# Patient Record
Sex: FEMALE | Race: WHITE | NOT HISPANIC OR LATINO | Employment: FULL TIME | URBAN - METROPOLITAN AREA
[De-identification: names, ages, dates, MRNs, and addresses within clinical notes are randomized per-mention and may not be internally consistent; named-entity substitution may affect disease eponyms.]

---

## 2018-01-26 ENCOUNTER — OFFICE VISIT (OUTPATIENT)
Dept: URGENT CARE | Facility: CLINIC | Age: 43
End: 2018-01-26
Payer: COMMERCIAL

## 2018-01-26 VITALS
SYSTOLIC BLOOD PRESSURE: 106 MMHG | WEIGHT: 122 LBS | HEIGHT: 67 IN | BODY MASS INDEX: 19.15 KG/M2 | HEART RATE: 97 BPM | DIASTOLIC BLOOD PRESSURE: 60 MMHG | TEMPERATURE: 100.3 F | OXYGEN SATURATION: 98 % | RESPIRATION RATE: 16 BRPM

## 2018-01-26 DIAGNOSIS — J06.9 ACUTE URI: Primary | ICD-10-CM

## 2018-01-26 PROCEDURE — G0382 LEV 3 HOSP TYPE B ED VISIT: HCPCS

## 2018-01-26 RX ORDER — AZITHROMYCIN 250 MG/1
TABLET, FILM COATED ORAL
Qty: 6 TABLET | Refills: 0 | Status: SHIPPED | OUTPATIENT
Start: 2018-01-26 | End: 2018-01-30

## 2018-01-26 NOTE — PATIENT INSTRUCTIONS
You appear to have an upper respiratory infection  You are to take the azithromycin as prescribed  Increase water intake  Saline nasal spray as directed  You are to take Robitussin for cough  You are to follow up with your PCP in 2 days  Acute Bronchitis   WHAT YOU NEED TO KNOW:   Acute bronchitis is swelling and irritation in the air passages of your lungs  This irritation may cause you to cough or have other breathing problems  Acute bronchitis often starts because of another illness, such as a cold or the flu  The illness spreads from your nose and throat to your windpipe and airways  Bronchitis is often called a chest cold  Acute bronchitis lasts about 3 to 6 weeks and is usually not a serious illness  Your cough can last for several weeks  DISCHARGE INSTRUCTIONS:   Return to the emergency department if:   · You cough up blood  · Your lips or fingernails turn blue  · You feel like you are not getting enough air when you breathe  Contact your healthcare provider if:   · You have a fever  · Your breathing problems do not go away or get worse  · Your cough does not get better within 4 weeks  · You have questions or concerns about your condition or care  Self-care:   · Get more rest   Rest helps your body to heal  Slowly start to do more each day  Rest when you feel it is needed  · Avoid irritants in the air  Avoid chemicals, fumes, and dust  Wear a face mask if you must work around dust or fumes  Stay inside on days when air pollution levels are high  If you have allergies, stay inside when pollen counts are high  Do not use aerosol products, such as spray-on deodorant, bug spray, and hair spray  · Do not smoke or be around others who smoke  Nicotine and other chemicals in cigarettes and cigars damages the cilia that move mucus out of your lungs  Ask your healthcare provider for information if you currently smoke and need help to quit   E-cigarettes or smokeless tobacco still contain nicotine  Talk to your healthcare provider before you use these products  · Drink liquids as directed  Liquids help keep your air passages moist and help you cough up mucus  You may need to drink more liquids when you have acute bronchitis  Ask how much liquid to drink each day and which liquids are best for you  · Use a humidifier or vaporizer  Use a cool mist humidifier or a vaporizer to increase air moisture in your home  This may make it easier for you to breathe and help decrease your cough  Decrease risk for acute bronchitis:   · Get the vaccinations you need  Ask your healthcare provider if you should get vaccinated against the flu or pneumonia  · Prevent the spread of germs  You can decrease your risk of acute bronchitis and other illnesses by doing the following:     [facility] [place] often with soap and water  Carry germ-killing hand lotion or gel with you  You can use the lotion or gel to clean your hands when soap and water are not available  ¨ Do not touch your eyes, nose, or mouth unless you have washed your hands first     ¨ Always cover your mouth when you cough to prevent the spread of germs  It is best to cough into a tissue or your shirt sleeve instead of into your hand  Ask those around you cover their mouths when they cough  ¨ Try to avoid people who have a cold or the flu  If you are sick, stay away from others as much as possible  Medicines: Your healthcare provider may  give you any of the following:  · Ibuprofen or acetaminophen  are medicines that help lower your fever  They are available without a doctor's order  Ask your healthcare provider which medicine is right for you  Ask how much to take and how often to take it  Follow directions  These medicines can cause stomach bleeding if not taken correctly  Ibuprofen can cause kidney damage  Do not take ibuprofen if you have kidney disease, an ulcer, or allergies to aspirin  Acetaminophen can cause liver damage  Do not take more than 4,000 milligrams in 24 hours  · Decongestants  help loosen mucus in your lungs and make it easier to cough up  This can help you breathe easier  · Cough suppressants  decrease your urge to cough  If your cough produces mucus, do not take a cough suppressant unless your healthcare provider tells you to  Your healthcare provider may suggest that you take a cough suppressant at night so you can rest     · Inhalers  may be given  Your healthcare provider may give you one or more inhalers to help you breathe easier and cough less  An inhaler gives your medicine to open your airways  Ask your healthcare provider to show you how to use your inhaler correctly  · Take your medicine as directed  Contact your healthcare provider if you think your medicine is not helping or if you have side effects  Tell him of her if you are allergic to any medicine  Keep a list of the medicines, vitamins, and herbs you take  Include the amounts, and when and why you take them  Bring the list or the pill bottles to follow-up visits  Carry your medicine list with you in case of an emergency  Follow up with your healthcare provider as directed:  Write down questions you have so you will remember to ask them during your follow-up visits  © 2017 [employer] Information is for End User's use only and may not be sold, redistributed or otherwise used for commercial purposes  All illustrations and images included in CareNotes® are the copyrighted property of [employer]  or [employer]  The above information is an  only  It is not intended as medical advice for individual conditions or treatments  Talk to your doctor, nurse or pharmacist before following any medical regimen to see if it is safe and effective for you

## 2018-01-26 NOTE — PROGRESS NOTES
Assessment/Plan:      Diagnoses and all orders for this visit:    Acute URI  -     azithromycin (ZITHROMAX) 250 mg tablet; Take 2 tablets today then 1 tablet daily x 4 days          Subjective:     Patient ID: Ciera Hahn is a 43 y o  female  This is a 43year old female who states her son had the subjective flu symptoms and his went away in 3 days  Pt states that she started with flu like symptoms on Monday and continues to have  She sates temp on Monday 101  She has been taking OTC products w/o relief  She states + chills, body aches, headache, + green sputum  She is + smoker  Cough   Associated symptoms include chills, a fever, myalgias and a sore throat  Generalized Body Aches   Associated symptoms include congestion, a sore throat, a fever and coughing  Review of Systems   Constitutional: Positive for chills and fever  HENT: Positive for congestion, sinus pain, sinus pressure and sore throat  Eyes: Negative  Respiratory: Positive for cough  Cardiovascular: Negative  Gastrointestinal: Negative  Endocrine: Negative  Genitourinary: Negative  Musculoskeletal: Positive for myalgias  Skin: Negative  Allergic/Immunologic: Negative  Neurological: Negative  Hematological: Negative  Psychiatric/Behavioral: Negative  Objective:     Physical Exam   Constitutional: She appears well-developed and well-nourished  No distress  HENT:   Head: Normocephalic and atraumatic  Nose: Nose normal    Mouth/Throat: No oropharyngeal exudate  B/L TM bulging   Oropharyngeal injected    Eyes: Conjunctivae are normal  Pupils are equal, round, and reactive to light  Neck: Normal range of motion  Neck supple  Cardiovascular: Normal rate and normal heart sounds  Pulmonary/Chest: Effort normal and breath sounds normal  No respiratory distress  She has no wheezes  She has no rales  She exhibits no tenderness  Abdominal: Soft   Bowel sounds are normal  She exhibits no distension  There is no tenderness  Musculoskeletal: Normal range of motion  She exhibits no edema, tenderness or deformity  Neurological: She is alert  Skin: Skin is warm and dry  She is not diaphoretic  Psychiatric: She has a normal mood and affect  Her behavior is normal  Judgment and thought content normal    Nursing note and vitals reviewed

## 2018-09-07 ENCOUNTER — OFFICE VISIT (OUTPATIENT)
Dept: OBGYN CLINIC | Facility: CLINIC | Age: 43
End: 2018-09-07
Payer: COMMERCIAL

## 2018-09-07 ENCOUNTER — APPOINTMENT (OUTPATIENT)
Dept: RADIOLOGY | Facility: CLINIC | Age: 43
End: 2018-09-07
Payer: COMMERCIAL

## 2018-09-07 VITALS
DIASTOLIC BLOOD PRESSURE: 82 MMHG | SYSTOLIC BLOOD PRESSURE: 108 MMHG | BODY MASS INDEX: 19.34 KG/M2 | HEIGHT: 67 IN | WEIGHT: 123.2 LBS

## 2018-09-07 DIAGNOSIS — M79.644 PAIN OF FINGER OF RIGHT HAND: ICD-10-CM

## 2018-09-07 DIAGNOSIS — S61.214A LACERATION OF RIGHT RING FINGER WITHOUT FOREIGN BODY WITHOUT DAMAGE TO NAIL, INITIAL ENCOUNTER: Primary | ICD-10-CM

## 2018-09-07 PROCEDURE — 73140 X-RAY EXAM OF FINGER(S): CPT

## 2018-09-07 PROCEDURE — 99203 OFFICE O/P NEW LOW 30 MIN: CPT | Performed by: ORTHOPAEDIC SURGERY

## 2018-09-07 NOTE — ASSESSMENT & PLAN NOTE
Findings consistent with right ring finger laceration, suspect healing extensor tendon laceration  Discussed findings and treatment options with the patient  I have review patient's right ring finger x-ray with her  Discussed prognosis of her injury  I recommend patient to attend occupational therapy to rehabilitate her finger  I think patient's symptoms may be related to scar tissue over the laceration  She appeared to have good finger function  I would like to see patient back in 3-4 weeks for re-evaluation  All patient's questions were answered to her satisfaction  This note is created using dictation transcription  It may contain typographical errors, grammatical errors, improperly dictated words, background noise and other errors

## 2018-09-07 NOTE — PROGRESS NOTES
Assessment:     1  Laceration of right ring finger without foreign body without damage to nail, initial encounter    2  Pain of finger of right hand        Plan:     Problem List Items Addressed This Visit        Other    Laceration of right ring finger - Primary     Findings consistent with right ring finger laceration, suspect healing extensor tendon laceration  Discussed findings and treatment options with the patient  I have review patient's right ring finger x-ray with her  Discussed prognosis of her injury  I recommend patient to attend occupational therapy to rehabilitate her finger  I think patient's symptoms may be related to scar tissue over the laceration  She appeared to have good finger function  I would like to see patient back in 3-4 weeks for re-evaluation  All patient's questions were answered to her satisfaction  This note is created using dictation transcription  It may contain typographical errors, grammatical errors, improperly dictated words, background noise and other errors  Relevant Orders    Ambulatory referral to Occupational Therapy      Other Visit Diagnoses     Pain of finger of right hand        Relevant Orders    XR finger right fourth digit-ring         Subjective:     Patient ID: Evangelina Frias is a 43 y o  female  Chief Complaint:  66-year-old female injured her right ring finger on 7/6/2018  Patient had a laceration to her right ring finger dorsally distal to the PIP joint by a broken glass  Patient stated the laceration is about a cm and horizontally  Patient did not seek medical attention  Her laceration did heal but she noticed persistent swelling and discomfort by the laceration  She also feels that the finger is weaker in extension  She denies fever, chills, or sweats  Information on patient's intake form was reviewed  Allergy:  No Known Allergies  Medications:  all current active meds have been reviewed  Past Medical History:  History reviewed  No pertinent past medical history  Past Surgical History:  Past Surgical History:   Procedure Laterality Date    COLONOSCOPY      TYMPANOSTOMY TUBE PLACEMENT       Family History:  History reviewed  No pertinent family history  Social History:  History   Alcohol Use    Yes     History   Drug use: Unknown     History   Smoking Status    Current Every Day Smoker   Smokeless Tobacco    Never Used     Review of Systems   HENT: Negative  Eyes: Negative  Respiratory: Negative  Cardiovascular: Negative  Gastrointestinal: Negative  Endocrine: Negative  Genitourinary: Negative  Musculoskeletal: Positive for arthralgias (Right ring finger) and joint swelling (Right ring finger)  Skin: Negative  Allergic/Immunologic: Negative  Neurological: Positive for weakness (Right ring finger in extension)  Negative for numbness  Hematological: Negative  Psychiatric/Behavioral: Negative  Objective:  BP Readings from Last 1 Encounters:   09/07/18 108/82      Wt Readings from Last 1 Encounters:   09/07/18 55 9 kg (123 lb 3 2 oz)      BMI:   Estimated body mass index is 19 3 kg/m² as calculated from the following:    Height as of this encounter: 5' 7" (1 702 m)  Weight as of this encounter: 55 9 kg (123 lb 3 2 oz)  BSA:   Estimated body surface area is 1 65 meters squared as calculated from the following:    Height as of this encounter: 5' 7" (1 702 m)  Weight as of this encounter: 55 9 kg (123 lb 3 2 oz)  Physical Exam   Constitutional: She is oriented to person, place, and time  She appears well-developed  HENT:   Head: Normocephalic and atraumatic  Eyes: EOM are normal  Pupils are equal, round, and reactive to light  Neck: Neck supple  Neurological: She is alert and oriented to person, place, and time  Skin: Skin is warm  Psychiatric: She has a normal mood and affect  Nursing note and vitals reviewed      Right Hand Exam     Tenderness   The patient is experiencing tenderness in the dorsal area (Right ring finger distal to the PIP joint)  Range of Motion   The patient has normal right wrist ROM  Muscle Strength   : 5/5     Other   Erythema: absent  Scars: present (Well-healed laceration  No signs of infection)  Sensation: normal  Pulse: present    Comments: There is swelling over the healed laceration  Patient has good active and passive ring finger range of motion  She has good strength with resistant MCP, PIP, and DIP joint extension  I have personally reviewed pertinent films in PACS and my interpretation is Right ring finger show good joint alignment  No metallic foreign body  Soft tissue swelling over the dorsum of middle phalanx at the PIP joint

## 2019-12-02 ENCOUNTER — OFFICE VISIT (OUTPATIENT)
Dept: URGENT CARE | Facility: CLINIC | Age: 44
End: 2019-12-02
Payer: COMMERCIAL

## 2019-12-02 VITALS
WEIGHT: 123 LBS | HEIGHT: 67 IN | HEART RATE: 79 BPM | SYSTOLIC BLOOD PRESSURE: 155 MMHG | OXYGEN SATURATION: 99 % | DIASTOLIC BLOOD PRESSURE: 74 MMHG | BODY MASS INDEX: 19.3 KG/M2 | RESPIRATION RATE: 20 BRPM | TEMPERATURE: 98.8 F

## 2019-12-02 DIAGNOSIS — N30.01 ACUTE CYSTITIS WITH HEMATURIA: ICD-10-CM

## 2019-12-02 DIAGNOSIS — R30.0 DYSURIA: Primary | ICD-10-CM

## 2019-12-02 LAB
SL AMB  POCT GLUCOSE, UA: ABNORMAL
SL AMB LEUKOCYTE ESTERASE,UA: ABNORMAL
SL AMB POCT BILIRUBIN,UA: ABNORMAL
SL AMB POCT BLOOD,UA: ABNORMAL
SL AMB POCT CLARITY,UA: ABNORMAL
SL AMB POCT COLOR,UA: YELLOW
SL AMB POCT KETONES,UA: ABNORMAL
SL AMB POCT NITRITE,UA: ABNORMAL
SL AMB POCT PH,UA: 5
SL AMB POCT SPECIFIC GRAVITY,UA: 1.01
SL AMB POCT URINE PROTEIN: 30
SL AMB POCT UROBILINOGEN: 0.2

## 2019-12-02 PROCEDURE — 81002 URINALYSIS NONAUTO W/O SCOPE: CPT | Performed by: FAMILY MEDICINE

## 2019-12-02 PROCEDURE — G0382 LEV 3 HOSP TYPE B ED VISIT: HCPCS | Performed by: FAMILY MEDICINE

## 2019-12-02 RX ORDER — SULFAMETHOXAZOLE AND TRIMETHOPRIM 800; 160 MG/1; MG/1
1 TABLET ORAL EVERY 12 HOURS SCHEDULED
Qty: 6 TABLET | Refills: 0 | Status: SHIPPED | OUTPATIENT
Start: 2019-12-02 | End: 2019-12-05

## 2022-05-23 ENCOUNTER — APPOINTMENT (EMERGENCY)
Dept: RADIOLOGY | Facility: HOSPITAL | Age: 47
End: 2022-05-23
Payer: COMMERCIAL

## 2022-05-23 ENCOUNTER — HOSPITAL ENCOUNTER (EMERGENCY)
Facility: HOSPITAL | Age: 47
Discharge: HOME | End: 2022-05-23
Attending: EMERGENCY MEDICINE
Payer: COMMERCIAL

## 2022-05-23 VITALS
BODY MASS INDEX: 19.46 KG/M2 | RESPIRATION RATE: 18 BRPM | HEIGHT: 67 IN | DIASTOLIC BLOOD PRESSURE: 74 MMHG | OXYGEN SATURATION: 97 % | WEIGHT: 124 LBS | SYSTOLIC BLOOD PRESSURE: 167 MMHG | TEMPERATURE: 98 F | HEART RATE: 83 BPM

## 2022-05-23 DIAGNOSIS — R07.89 CHEST TIGHTNESS: Primary | ICD-10-CM

## 2022-05-23 LAB
ALBUMIN SERPL BCP-MCNC: 4.2 G/DL (ref 3.5–5)
ALP SERPL-CCNC: 68 U/L (ref 46–116)
ALT SERPL W P-5'-P-CCNC: 19 U/L (ref 12–78)
ANION GAP SERPL CALCULATED.3IONS-SCNC: 7 MMOL/L (ref 4–13)
AST SERPL W P-5'-P-CCNC: 19 U/L (ref 5–45)
BASOPHILS # BLD AUTO: 0.04 THOUSANDS/ΜL (ref 0–0.1)
BASOPHILS NFR BLD AUTO: 1 % (ref 0–1)
BILIRUB SERPL-MCNC: 0.6 MG/DL (ref 0.2–1)
BUN SERPL-MCNC: 12 MG/DL (ref 5–25)
CALCIUM SERPL-MCNC: 9.1 MG/DL (ref 8.3–10.1)
CARDIAC TROPONIN I PNL SERPL HS: <2 NG/L
CHLORIDE SERPL-SCNC: 104 MMOL/L (ref 100–108)
CO2 SERPL-SCNC: 28 MMOL/L (ref 21–32)
CREAT SERPL-MCNC: 0.8 MG/DL (ref 0.6–1.3)
D DIMER PPP FEU-MCNC: <0.27 UG/ML FEU
EOSINOPHIL # BLD AUTO: 0.1 THOUSAND/ΜL (ref 0–0.61)
EOSINOPHIL NFR BLD AUTO: 1 % (ref 0–6)
ERYTHROCYTE [DISTWIDTH] IN BLOOD BY AUTOMATED COUNT: 12.3 % (ref 11.6–15.1)
FLUAV RNA RESP QL NAA+PROBE: NEGATIVE
FLUBV RNA RESP QL NAA+PROBE: NEGATIVE
GFR SERPL CREATININE-BSD FRML MDRD: 88 ML/MIN/1.73SQ M
GLUCOSE SERPL-MCNC: 142 MG/DL (ref 65–140)
HCT VFR BLD AUTO: 41.6 % (ref 34.8–46.1)
HGB BLD-MCNC: 13.4 G/DL (ref 11.5–15.4)
IMM GRANULOCYTES # BLD AUTO: 0.01 THOUSAND/UL (ref 0–0.2)
IMM GRANULOCYTES NFR BLD AUTO: 0 % (ref 0–2)
LYMPHOCYTES # BLD AUTO: 1.71 THOUSANDS/ΜL (ref 0.6–4.47)
LYMPHOCYTES NFR BLD AUTO: 23 % (ref 14–44)
MCH RBC QN AUTO: 32.2 PG (ref 26.8–34.3)
MCHC RBC AUTO-ENTMCNC: 32.2 G/DL (ref 31.4–37.4)
MCV RBC AUTO: 100 FL (ref 82–98)
MONOCYTES # BLD AUTO: 0.67 THOUSAND/ΜL (ref 0.17–1.22)
MONOCYTES NFR BLD AUTO: 9 % (ref 4–12)
NEUTROPHILS # BLD AUTO: 4.92 THOUSANDS/ΜL (ref 1.85–7.62)
NEUTS SEG NFR BLD AUTO: 66 % (ref 43–75)
NRBC BLD AUTO-RTO: 0 /100 WBCS
PLATELET # BLD AUTO: 204 THOUSANDS/UL (ref 149–390)
PMV BLD AUTO: 9.1 FL (ref 8.9–12.7)
POTASSIUM SERPL-SCNC: 4.1 MMOL/L (ref 3.5–5.3)
PROT SERPL-MCNC: 7.2 G/DL (ref 6.4–8.2)
RBC # BLD AUTO: 4.16 MILLION/UL (ref 3.81–5.12)
RSV RNA RESP QL NAA+PROBE: NEGATIVE
SARS-COV-2 RNA RESP QL NAA+PROBE: NEGATIVE
SODIUM SERPL-SCNC: 139 MMOL/L (ref 136–145)
WBC # BLD AUTO: 7.45 THOUSAND/UL (ref 4.31–10.16)

## 2022-05-23 PROCEDURE — 85025 COMPLETE CBC W/AUTO DIFF WBC: CPT | Performed by: EMERGENCY MEDICINE

## 2022-05-23 PROCEDURE — 84484 ASSAY OF TROPONIN QUANT: CPT | Performed by: EMERGENCY MEDICINE

## 2022-05-23 PROCEDURE — 80053 COMPREHEN METABOLIC PANEL: CPT | Performed by: EMERGENCY MEDICINE

## 2022-05-23 PROCEDURE — 85379 FIBRIN DEGRADATION QUANT: CPT | Performed by: EMERGENCY MEDICINE

## 2022-05-23 PROCEDURE — 99285 EMERGENCY DEPT VISIT HI MDM: CPT

## 2022-05-23 PROCEDURE — 0241U HB NFCT DS VIR RESP RNA 4 TRGT: CPT | Performed by: EMERGENCY MEDICINE

## 2022-05-23 PROCEDURE — 99285 EMERGENCY DEPT VISIT HI MDM: CPT | Performed by: EMERGENCY MEDICINE

## 2022-05-23 PROCEDURE — 71046 X-RAY EXAM CHEST 2 VIEWS: CPT

## 2022-05-23 PROCEDURE — 36415 COLL VENOUS BLD VENIPUNCTURE: CPT

## 2022-05-23 PROCEDURE — 93005 ELECTROCARDIOGRAM TRACING: CPT

## 2022-05-23 RX ORDER — HYDROXYZINE HYDROCHLORIDE 25 MG/1
25 TABLET, FILM COATED ORAL 3 TIMES DAILY PRN
Qty: 20 TABLET | Refills: 0 | Status: SHIPPED | OUTPATIENT
Start: 2022-05-23

## 2022-05-23 NOTE — ED PROVIDER NOTES
History  Chief Complaint   Patient presents with    Shortness of Breath     Unable to take a deep breath past 2 days       This is a 59-year-old female presents for evaluation of chest tightness that started 2 days ago worse in certain positions denies any fever cough  She did  stop smoking 2 days ago  No birth control use no history of DVT or PE  History provided by:  Patient  Medical Problem  Location:   chest  Quality:   tightness  Severity:  Moderate  Onset quality:  Gradual  Duration:  2 days  Timing:  Constant  Progression:  Waxing and waning  Chronicity:  New  Context:   chest tightness over the past 2 days  Worsened by:   certain positions lying on her right side  Associated symptoms: shortness of breath        None       No past medical history on file  Past Surgical History:   Procedure Laterality Date    COLONOSCOPY      TYMPANOSTOMY TUBE PLACEMENT         No family history on file  I have reviewed and agree with the history as documented  E-Cigarette/Vaping     E-Cigarette/Vaping Substances     Social History     Tobacco Use    Smoking status: Current Every Day Smoker     Packs/day: 1 00    Smokeless tobacco: Never Used   Substance Use Topics    Alcohol use: Yes     Alcohol/week: 2 0 standard drinks     Types: 2 Glasses of wine per week     Comment: daily    Drug use: Never       Review of Systems   Respiratory: Positive for chest tightness and shortness of breath  Psychiatric/Behavioral: The patient is nervous/anxious  All other systems reviewed and are negative  Physical Exam  Physical Exam  Vitals and nursing note reviewed  Constitutional:       General: She is not in acute distress  Appearance: She is not ill-appearing, toxic-appearing or diaphoretic  HENT:      Head: Normocephalic and atraumatic        Right Ear: Tympanic membrane, ear canal and external ear normal       Left Ear: Tympanic membrane, ear canal and external ear normal       Nose: Nose normal  Eyes:      General: No scleral icterus  Right eye: No discharge  Left eye: No discharge  Extraocular Movements: Extraocular movements intact  Pupils: Pupils are equal, round, and reactive to light  Cardiovascular:      Rate and Rhythm: Normal rate and regular rhythm  Pulses: Normal pulses  Heart sounds: No murmur heard  No friction rub  No gallop  Pulmonary:      Effort: Pulmonary effort is normal  No respiratory distress  Breath sounds: No stridor  No wheezing, rhonchi or rales  Abdominal:      General: There is no distension  Palpations: Abdomen is soft  There is no mass  Tenderness: There is no abdominal tenderness  There is no guarding or rebound  Musculoskeletal:         General: No swelling, tenderness, deformity or signs of injury  Normal range of motion  Cervical back: Normal range of motion and neck supple  No rigidity or tenderness  Right lower leg: No edema  Left lower leg: No edema  Skin:     General: Skin is warm and dry  Coloration: Skin is not jaundiced  Findings: No bruising, erythema or rash  Neurological:      General: No focal deficit present  Mental Status: She is alert and oriented to person, place, and time  Cranial Nerves: No cranial nerve deficit  Sensory: No sensory deficit  Motor: No weakness  Coordination: Coordination normal    Psychiatric:         Thought Content:  Thought content normal       Comments:  anxious         Vital Signs  ED Triage Vitals [05/23/22 1255]   Temperature Pulse Respirations Blood Pressure SpO2   98 °F (36 7 °C) 83 18 167/74 97 %      Temp Source Heart Rate Source Patient Position - Orthostatic VS BP Location FiO2 (%)   Oral Monitor Sitting Left arm --      Pain Score       No Pain           Vitals:    05/23/22 1255   BP: 167/74   Pulse: 83   Patient Position - Orthostatic VS: Sitting         Visual Acuity      ED Medications  Medications - No data to display    Diagnostic Studies  Results Reviewed     Procedure Component Value Units Date/Time    D-Dimer [[id number]]  (Normal) Collected: 05/23/22 1639    Lab Status: Final result Specimen: Blood from Arm, Right Updated: 05/23/22 1732     D-Dimer, Quant <0 27 ug/ml FEU     COVID/FLU/RSV [[id number]]  (Normal) Collected: 05/23/22 1302    Lab Status: Final result Specimen: Nares from Nose Updated: 05/23/22 1451     SARS-CoV-2 Negative     INFLUENZA A PCR Negative     INFLUENZA B PCR Negative     RSV PCR Negative    Narrative:      FOR PEDIATRIC PATIENTS - copy/paste COVID Guidelines URL to browser: [email or web address]  [employer]    SARS-CoV-2 assay is a Nucleic Acid Amplification assay intended for the  qualitative detection of nucleic acid from SARS-CoV-2 in nasopharyngeal  swabs  Results are for the presumptive identification of SARS-CoV-2 RNA  Positive results are indicative of infection with SARS-CoV-2, the virus  causing COVID-19, but do not rule out bacterial infection or co-infection  with other viruses  Laboratories within the [place] and its  territories are required to report all positive results to the appropriate  public health authorities  Negative results do not preclude SARS-CoV-2  infection and should not be used as the sole basis for treatment or other  patient management decisions  Negative results must be combined with  clinical observations, patient history, and epidemiological information  This test has not been FDA cleared or approved  This test has been authorized by FDA under an Emergency Use Authorization  (EUA)  This test is only authorized for the duration of time the  declaration that circumstances exist justifying the authorization of the  emergency use of an in vitro diagnostic tests for detection of SARS-CoV-2  virus and/or diagnosis of COVID-19 infection under section 564(b)(1) of  the Act, 21 U  S C  554XMD-2(L)(2), unless the authorization is terminated  or revoked sooner  The test has been validated but independent review by FDA  and CLIA is pending  Test performed using [employer] GeneXpert: This RT-PCR assay targets N2,  a region unique to SARS-CoV-2  A conserved region in the E-gene was chosen  for pan-Sarbecovirus detection which includes SARS-CoV-2      HS Troponin 0hr (reflex protocol) [[id number]]  (Normal) Collected: 05/23/22 1302    Lab Status: Final result Specimen: Blood from Arm, Left Updated: 05/23/22 1417     hs TnI 0hr <2 ng/L     Comprehensive metabolic panel [[id number]]  (Abnormal) Collected: 05/23/22 1302    Lab Status: Final result Specimen: Blood from Arm, Left Updated: 05/23/22 1412     Sodium 139 mmol/L      Potassium 4 1 mmol/L      Chloride 104 mmol/L      CO2 28 mmol/L      ANION GAP 7 mmol/L      BUN 12 mg/dL      Creatinine 0 80 mg/dL      Glucose 142 mg/dL      Calcium 9 1 mg/dL      AST 19 U/L      ALT 19 U/L      Alkaline Phosphatase 68 U/L      Total Protein 7 2 g/dL      Albumin 4 2 g/dL      Total Bilirubin 0 60 mg/dL      eGFR 88 ml/min/1 73sq m     Narrative:      [place] guidelines for Chronic Kidney Disease (CKD):     Stage 1 with normal or high GFR (GFR > 90 mL/min/1 73 square meters)    Stage 2 Mild CKD (GFR = 60-89 mL/min/1 73 square meters)    Stage 3A Moderate CKD (GFR = 45-59 mL/min/1 73 square meters)    Stage 3B Moderate CKD (GFR = 30-44 mL/min/1 73 square meters)    Stage 4 Severe CKD (GFR = 15-29 mL/min/1 73 square meters)    Stage 5 End Stage CKD (GFR <15 mL/min/1 73 square meters)  Note: GFR calculation is accurate only with a steady state creatinine    CBC and differential [[id number]]  (Abnormal) Collected: 05/23/22 1302    Lab Status: Final result Specimen: Blood from Arm, Left Updated: 05/23/22 1308     WBC 7 45 Thousand/uL      RBC 4 16 Million/uL      Hemoglobin 13 4 g/dL      Hematocrit 41 6 %       fL      MCH 32 2 pg      MCHC 32 2 g/dL RDW 12 3 %      MPV 9 1 fL      Platelets 135 Thousands/uL      nRBC 0 /100 WBCs      Neutrophils Relative 66 %      Immat GRANS % 0 %      Lymphocytes Relative 23 %      Monocytes Relative 9 %      Eosinophils Relative 1 %      Basophils Relative 1 %      Neutrophils Absolute 4 92 Thousands/µL      Immature Grans Absolute 0 01 Thousand/uL      Lymphocytes Absolute 1 71 Thousands/µL      Monocytes Absolute 0 67 Thousand/µL      Eosinophils Absolute 0 10 Thousand/µL      Basophils Absolute 0 04 Thousands/µL                  XR chest pa & lateral   Final Result by Luis Carlos Browne MD (05/23 3879)      No acute cardiopulmonary disease                    Workstation performed: [id number]                    Procedures  ECG 12 Lead Documentation Only    Date/Time: 5/23/2022 4:40 PM  Performed by: Alysia Friend DO  Authorized by: Alysia Friend DO     ECG reviewed by me, the ED Provider: yes    Patient location:  ED  Rate:     ECG rate:  64  Rhythm:     Rhythm: sinus rhythm    Conduction:     Conduction: normal    T waves:     T waves: normal               ED Course             HEART Risk Score    Flowsheet Row Most Recent Value   Heart Score Risk Calculator    History 0 Filed at: 05/23/2022 1644   ECG 0 Filed at: 05/23/2022 [address]   Age 1 Filed at: 05/23/2022 1644   Risk Factors 1 Filed at: 05/23/2022 1644   Troponin 0 Filed at: 05/23/2022 1644   HEART Score 2 Filed at: 05/23/2022 1644                [address] for PE    Flowsheet Row Most Recent Value   PERC Rule for PE    Age >=50 0 Filed at: 05/23/2022 1634   HR >=100 0 Filed at: 05/23/2022 1634   O2 Sat on room air < 95% 0 Filed at: 05/23/2022 1634   History of PE or DVT 0 Filed at: 05/23/2022 1634   Recent trauma or surgery 0 Filed at: 05/23/2022 1634   Hemoptysis 0 Filed at: 05/23/2022 1634   Exogenous estrogen 0 Filed at: 05/23/2022 1634   Unilateral leg swelling 0 Filed at: 05/23/2022 1634   [address] for PE Results 0 Filed at: 05/23/2022 1634                  Jana Gann Criteria for PE    Flowsheet Row Most Recent Value   Billy Criteria for PE    Clinical signs and symptoms of DVT 0 Filed at: 05/23/2022 1633   PE is primary diagnosis or equally likely 0 Filed at: 05/23/2022 1633   HR >100 0 Filed at: 05/23/2022 1633   Immobilization at least 3 days or Surgery in the previous 4 weeks 0 Filed at: 05/23/2022 1633   Previous, objectively diagnosed PE or DVT 0 Filed at: 05/23/2022 1633   Hemoptysis 0 Filed at: 05/23/2022 1633   Malignancy with treatment within 6 months or palliative 0 Filed at: 05/23/2022 1633   Vince' Criteria Total 0 Filed at: 05/23/2022 1633                MDM  Number of Diagnoses or Management Options  Diagnosis management comments:  Chest tightness most likely anxiety but will check EKG labs rule out acute coronary syndrome low clinical suspicion for pulmonary embolism would check D-dimer       Amount and/or Complexity of Data Reviewed  Clinical lab tests: ordered  Tests in the radiology section of CPT®: ordered        Disposition  Final diagnoses:   Chest tightness     Time reflects when diagnosis was documented in both MDM as applicable and the Disposition within this note     Time User Action Codes Description Comment    5/23/2022  5:41 PM Jeronimo Guzmán Add [R07 89] Chest tightness       ED Disposition     ED Disposition   Discharge    Condition   Stable    Date/Time   Mon May 23, 2022  5:41 PM    Comment   Nina Contreras discharge to home/self care                 Follow-up Information     Follow up With Specialties Details Why Contact Info Additional Information    Екатерина Cheung DO Family Medicine In 1 day  [facility] [address] [facility] Emergency Department Emergency Medicine  As needed, If symptoms worsen [address] [place] [address]  [address] [facility] Emergency Department, [address] [facility] [place] [address] Patient's Medications   Discharge Prescriptions    HYDROXYZINE HCL (ATARAX) 25 MG TABLET    Take 1 tablet (25 mg total) by mouth as needed in the morning and 1 tablet (25 mg total) as needed at noon and 1 tablet (25 mg total) as needed in the evening for anxiety  Start Date: 5/23/2022 End Date: --       Order Dose: 25 mg       Quantity: 20 tablet    Refills: 0       No discharge procedures on file      PDMP Review     None          ED Provider  Electronically Signed by           Nellie Al DO  05/23/22 [address] [facility],   05/23/22 0509

## 2022-06-01 LAB
ATRIAL RATE: 64 BPM
P AXIS: 64 DEGREES
PR INTERVAL: 116 MS
QRS AXIS: 98 DEGREES
QRSD INTERVAL: 92 MS
QT INTERVAL: 428 MS
QTC INTERVAL: 441 MS
T WAVE AXIS: 75 DEGREES
VENTRICULAR RATE: 64 BPM

## 2022-06-01 PROCEDURE — 93010 ELECTROCARDIOGRAM REPORT: CPT | Performed by: INTERNAL MEDICINE

## 2023-01-20 ENCOUNTER — APPOINTMENT (OUTPATIENT)
Dept: RADIOLOGY | Facility: HOSPITAL | Age: 48
End: 2023-01-20

## 2023-01-20 ENCOUNTER — HOSPITAL ENCOUNTER (EMERGENCY)
Facility: HOSPITAL | Age: 48
Discharge: HOME | End: 2023-01-20
Attending: EMERGENCY MEDICINE

## 2023-01-20 VITALS
RESPIRATION RATE: 18 BRPM | SYSTOLIC BLOOD PRESSURE: 141 MMHG | HEIGHT: 67 IN | HEART RATE: 74 BPM | DIASTOLIC BLOOD PRESSURE: 96 MMHG | WEIGHT: 125 LBS | BODY MASS INDEX: 19.62 KG/M2 | OXYGEN SATURATION: 100 % | TEMPERATURE: 97.5 F

## 2023-01-20 DIAGNOSIS — R55 NEAR SYNCOPE: Primary | ICD-10-CM

## 2023-01-20 DIAGNOSIS — R21 RASH: ICD-10-CM

## 2023-01-20 LAB
ALBUMIN SERPL BCP-MCNC: 4.3 G/DL (ref 3.5–5)
ALP SERPL-CCNC: 69 U/L (ref 46–116)
ALT SERPL W P-5'-P-CCNC: 22 U/L (ref 12–78)
ANION GAP SERPL CALCULATED.3IONS-SCNC: 9 MMOL/L (ref 4–13)
AST SERPL W P-5'-P-CCNC: 26 U/L (ref 5–45)
BASOPHILS # BLD AUTO: 0.04 THOUSANDS/ÂΜL (ref 0–0.1)
BASOPHILS NFR BLD AUTO: 1 % (ref 0–1)
BILIRUB SERPL-MCNC: 0.5 MG/DL (ref 0.2–1)
BUN SERPL-MCNC: 13 MG/DL (ref 5–25)
CALCIUM SERPL-MCNC: 8.8 MG/DL (ref 8.3–10.1)
CARDIAC TROPONIN I PNL SERPL HS: <2 NG/L
CHLORIDE SERPL-SCNC: 102 MMOL/L (ref 96–108)
CO2 SERPL-SCNC: 26 MMOL/L (ref 21–32)
CREAT SERPL-MCNC: 0.67 MG/DL (ref 0.6–1.3)
D DIMER PPP FEU-MCNC: 0.31 UG/ML FEU
EOSINOPHIL # BLD AUTO: 0.1 THOUSAND/ÂΜL (ref 0–0.61)
EOSINOPHIL NFR BLD AUTO: 1 % (ref 0–6)
ERYTHROCYTE [DISTWIDTH] IN BLOOD BY AUTOMATED COUNT: 12.8 % (ref 11.6–15.1)
GFR SERPL CREATININE-BSD FRML MDRD: 105 ML/MIN/1.73SQ M
GLUCOSE SERPL-MCNC: 104 MG/DL (ref 65–140)
HCT VFR BLD AUTO: 40.4 % (ref 34.8–46.1)
HGB BLD-MCNC: 13.2 G/DL (ref 11.5–15.4)
IMM GRANULOCYTES # BLD AUTO: 0.02 THOUSAND/UL (ref 0–0.2)
IMM GRANULOCYTES NFR BLD AUTO: 0 % (ref 0–2)
LYMPHOCYTES # BLD AUTO: 1.64 THOUSANDS/ÂΜL (ref 0.6–4.47)
LYMPHOCYTES NFR BLD AUTO: 21 % (ref 14–44)
MCH RBC QN AUTO: 33 PG (ref 26.8–34.3)
MCHC RBC AUTO-ENTMCNC: 32.7 G/DL (ref 31.4–37.4)
MCV RBC AUTO: 101 FL (ref 82–98)
MONOCYTES # BLD AUTO: 0.63 THOUSAND/ÂΜL (ref 0.17–1.22)
MONOCYTES NFR BLD AUTO: 8 % (ref 4–12)
NEUTROPHILS # BLD AUTO: 5.36 THOUSANDS/ÂΜL (ref 1.85–7.62)
NEUTS SEG NFR BLD AUTO: 69 % (ref 43–75)
NRBC BLD AUTO-RTO: 0 /100 WBCS
PLATELET # BLD AUTO: 236 THOUSANDS/UL (ref 149–390)
PMV BLD AUTO: 9 FL (ref 8.9–12.7)
POTASSIUM SERPL-SCNC: 4.4 MMOL/L (ref 3.5–5.3)
PROT SERPL-MCNC: 7.9 G/DL (ref 6.4–8.4)
RBC # BLD AUTO: 4 MILLION/UL (ref 3.81–5.12)
SODIUM SERPL-SCNC: 137 MMOL/L (ref 135–147)
WBC # BLD AUTO: 7.79 THOUSAND/UL (ref 4.31–10.16)

## 2023-01-20 RX ORDER — ASPIRIN 81 MG/1
81 TABLET, CHEWABLE ORAL DAILY
COMMUNITY

## 2023-01-20 RX ORDER — LOSARTAN POTASSIUM 50 MG/1
50 TABLET ORAL DAILY
COMMUNITY

## 2023-01-20 NOTE — ED PROVIDER NOTES
History  Chief Complaint   Patient presents with   • Dizziness     Patient presents to the ED With c/o dizziness and SOB, states she had surgery on right knee in December and noticed pain in the back of that calf 2 days ago, states that she was sent from PCP to r/o clot      History from patient and her , past medical history hypertension taking losartan (dose increased about 1 month ago), also recent arthroscopy right knee a few weeks ago  She does drink alcohol daily and evenings either wine or cocktail  She does not have breakfast on a regular basis just a couple coffee  She works from home  This morning around 10 AM she felt lightheaded and dizzy while she was sitting working on the computer  Associated chest tightness  Symptoms waxed and waned over the next hour  She kept checking her blood pressure and she noticed it was low  When she stood up she felt a little better but had to hold onto the desk  Also she has been noticing some redness at the back of her right leg and some calf pain on the right side over the last couple days so she was concerned that she could have a blood clot that is traveled to the lungs  Prior to Admission Medications   Prescriptions Last Dose Informant Patient Reported? Taking?   aspirin 81 mg chewable tablet   Yes Yes   Sig: Chew 81 mg daily   hydrOXYzine HCL (ATARAX) 25 mg tablet   No No   Sig: Take 1 tablet (25 mg total) by mouth as needed in the morning and 1 tablet (25 mg total) as needed at noon and 1 tablet (25 mg total) as needed in the evening for anxiety  losartan (COZAAR) 50 mg tablet   Yes Yes   Sig: Take 50 mg by mouth daily      Facility-Administered Medications: None       History reviewed  No pertinent past medical history  Past Surgical History:   Procedure Laterality Date   • COLONOSCOPY     • KNEE SURGERY     • TYMPANOSTOMY TUBE PLACEMENT         History reviewed  No pertinent family history    I have reviewed and agree with the history as documented  E-Cigarette/Vaping     E-Cigarette/Vaping Substances     Social History     Tobacco Use   • Smoking status: Every Day     Packs/day: 0 50     Types: Cigarettes   • Smokeless tobacco: Never   Substance Use Topics   • Alcohol use: Yes     Alcohol/week: 2 0 standard drinks     Types: 2 Glasses of wine per week     Comment: daily   • Drug use: Never       Review of Systems   Constitutional: Negative for chills and fever  HENT: Negative for rhinorrhea and sore throat  Respiratory: Positive for chest tightness and shortness of breath  Cardiovascular: Negative for chest pain  Gastrointestinal: Negative for constipation, diarrhea, nausea and vomiting  Genitourinary: Negative for dysuria and frequency  Skin: Negative for rash  Neurological: Positive for dizziness and light-headedness  All other systems reviewed and are negative  Physical Exam  Physical Exam  Vitals and nursing note reviewed  Constitutional:       Appearance: She is well-developed  HENT:      Head: Normocephalic and atraumatic  Right Ear: External ear normal       Left Ear: External ear normal       Nose: Nose normal    Eyes:      Conjunctiva/sclera: Conjunctivae normal       Pupils: Pupils are equal, round, and reactive to light  Cardiovascular:      Rate and Rhythm: Normal rate and regular rhythm  Heart sounds: Normal heart sounds  Pulmonary:      Effort: Pulmonary effort is normal  No respiratory distress  Breath sounds: Normal breath sounds  No wheezing  Abdominal:      General: Bowel sounds are normal  There is no distension  Palpations: Abdomen is soft  Tenderness: There is no abdominal tenderness  Musculoskeletal:         General: No deformity  Normal range of motion  Cervical back: Normal range of motion and neck supple  No spinous process tenderness  Right ankle: Normal pulse  Left ankle: Normal pulse  Legs:    Skin:     General: Skin is warm and dry  Findings: No rash  Neurological:      General: No focal deficit present  Mental Status: She is alert  GCS: GCS eye subscore is 4  GCS verbal subscore is 5  GCS motor subscore is 6  Sensory: No sensory deficit     Psychiatric:         Mood and Affect: Mood normal          Vital Signs  ED Triage Vitals   Temperature Pulse Respirations Blood Pressure SpO2   01/20/23 1148 01/20/23 1143 01/20/23 1143 01/20/23 1143 01/20/23 1143   97 5 °F (36 4 °C) 74 18 141/96 100 %      Temp Source Heart Rate Source Patient Position - Orthostatic VS BP Location FiO2 (%)   01/20/23 1148 01/20/23 1143 01/20/23 1143 01/20/23 1143 --   Temporal Monitor Sitting Left arm       Pain Score       01/20/23 1143       2           Vitals:    01/20/23 1143   BP: 141/96   Pulse: 74   Patient Position - Orthostatic VS: Sitting         Visual Acuity      ED Medications  Medications - No data to display    Diagnostic Studies  Results Reviewed     Procedure Component Value Units Date/Time    HS Troponin 0hr (reflex protocol) [[id number]]  (Normal) Collected: 01/20/23 1206    Lab Status: Final result Specimen: Blood from Arm, Right Updated: 01/20/23 1235     hs TnI 0hr <2 ng/L     Comprehensive metabolic panel [[id number]] Collected: 01/20/23 1206    Lab Status: Final result Specimen: Blood from Arm, Right Updated: 01/20/23 1228     Sodium 137 mmol/L      Potassium 4 4 mmol/L      Chloride 102 mmol/L      CO2 26 mmol/L      ANION GAP 9 mmol/L      BUN 13 mg/dL      Creatinine 0 67 mg/dL      Glucose 104 mg/dL      Calcium 8 8 mg/dL      AST 26 U/L      ALT 22 U/L      Alkaline Phosphatase 69 U/L      Total Protein 7 9 g/dL      Albumin 4 3 g/dL      Total Bilirubin 0 50 mg/dL      eGFR 105 ml/min/1 73sq m     Narrative:      [place] guidelines for Chronic Kidney Disease (CKD):   •  Stage 1 with normal or high GFR (GFR > 90 mL/min/1 73 square meters)  •  Stage 2 Mild CKD (GFR = 60-89 mL/min/1 73 square meters)  •  Stage 3A Moderate CKD (GFR = 45-59 mL/min/1 73 square meters)  •  Stage 3B Moderate CKD (GFR = 30-44 mL/min/1 73 square meters)  •  Stage 4 Severe CKD (GFR = 15-29 mL/min/1 73 square meters)  •  Stage 5 End Stage CKD (GFR <15 mL/min/1 73 square meters)  Note: GFR calculation is accurate only with a steady state creatinine    D-dimer, quantitative [[id number]]  (Normal) Collected: 01/20/23 1206    Lab Status: Final result Specimen: Blood from Arm, Right Updated: 01/20/23 1226     D-Dimer, Quant 0 31 ug/ml FEU     CBC and differential [[id number]]  (Abnormal) Collected: 01/20/23 1206    Lab Status: Final result Specimen: Blood from Arm, Right Updated: 01/20/23 1211     WBC 7 79 Thousand/uL      RBC 4 00 Million/uL      Hemoglobin 13 2 g/dL      Hematocrit 40 4 %       fL      MCH 33 0 pg      MCHC 32 7 g/dL      RDW 12 8 %      MPV 9 0 fL      Platelets 102 Thousands/uL      nRBC 0 /100 WBCs      Neutrophils Relative 69 %      Immat GRANS % 0 %      Lymphocytes Relative 21 %      Monocytes Relative 8 %      Eosinophils Relative 1 %      Basophils Relative 1 %      Neutrophils Absolute 5 36 Thousands/µL      Immature Grans Absolute 0 02 Thousand/uL      Lymphocytes Absolute 1 64 Thousands/µL      Monocytes Absolute 0 63 Thousand/µL      Eosinophils Absolute 0 10 Thousand/µL      Basophils Absolute 0 04 Thousands/µL                  XR chest pa & lateral   Final Result by Magalie Vasquez MD (01/20 1302)      No acute cardiopulmonary disease                    Workstation performed: [id number]                    Procedures  ECG 12 Lead Documentation Only    Date/Time: 1/20/2023 7:04 PM  Performed by: Yvonne Olea DO  Authorized by: Yvonne Olea DO     Indications / Diagnosis:  Chest tight  ECG reviewed by me, the ED Provider: yes    Patient location:  ED  Previous ECG:     Previous ECG:  Unavailable  Interpretation:     Interpretation: normal    Rate:     ECG rate:  64    ECG rate assessment: normal Rhythm:     Rhythm: sinus rhythm    Ectopy:     Ectopy: none    QRS:     QRS axis:  Normal    QRS intervals:  Normal  Conduction:     Conduction: normal    ST segments:     ST segments:  Normal  T waves:     T waves: normal               ED Course             HEART Risk Score    Flowsheet Row Most Recent Value   Heart Score Risk Calculator    History 0 Filed at: 01/20/2023 1905   ECG 0 Filed at: 01/20/2023 1905   Age 1 Filed at: 01/20/2023 1905   Risk Factors 0 Filed at: 01/20/2023 1905   Troponin 0 Filed at: 01/20/2023 1905   HEART Score 1 Filed at: 01/20/2023 1905                                      Medical Decision Making  Differential is coronary ischemia and PE/ dvt - ruled out with normal EKG/ nl trop and nl d-dimer  Other concern is near-syncope event with lightheaded and BP low at home - will have patient follow-up  Near syncope: acute illness or injury  Rash: acute illness or injury  Amount and/or Complexity of Data Reviewed  Labs: ordered  Disposition  Final diagnoses:   Near syncope   Rash - posterior right ankle     Time reflects when diagnosis was documented in both MDM as applicable and the Disposition within this note     Time User Action Codes Description Comment    1/20/2023  1:25 PM Chris Herbert Add [R55] Near syncope     1/20/2023  1:26 PM Linn Keon Add [R21] Rash     1/20/2023  1:26 PM Chris Herbert Modify [R21] Rash posterior right ankle      ED Disposition     ED Disposition   Discharge    Condition   Stable    Date/Time   Fri Jan 20, 2023  1:25 PM    Comment   Clari Kazt discharge to home/self care                 Follow-up Information     Follow up With Specialties Details Why [address] [place] [address] [facility] Schedule an appointment as soon as possible for a visit   [facility]  [address]  [phone]            Discharge Medication List as of 1/20/2023  1:26 PM      CONTINUE these medications which have NOT CHANGED    Details   aspirin 81 mg chewable tablet Chew 81 mg daily, Historical Med      losartan (COZAAR) 50 mg tablet Take 50 mg by mouth daily, Historical Med      hydrOXYzine HCL (ATARAX) 25 mg tablet Take 1 tablet (25 mg total) by mouth as needed in the morning and 1 tablet (25 mg total) as needed at noon and 1 tablet (25 mg total) as needed in the evening for anxiety  , Starting Mon 5/23/2022, Normal             No discharge procedures on file      PDMP Review     None          ED Provider  Electronically Signed by           Venus Galeana DO  01/20/23 9844

## 2024-02-21 PROBLEM — S61.214A LACERATION OF RIGHT RING FINGER: Status: RESOLVED | Noted: 2018-09-07 | Resolved: 2024-02-21

## 2025-04-18 ENCOUNTER — HOSPITAL ENCOUNTER (EMERGENCY)
Facility: HOSPITAL | Age: 50
Discharge: HOME | End: 2025-04-18
Attending: EMERGENCY MEDICINE
Payer: COMMERCIAL

## 2025-04-18 ENCOUNTER — APPOINTMENT (EMERGENCY)
Dept: RADIOLOGY | Facility: HOSPITAL | Age: 50
End: 2025-04-18
Payer: COMMERCIAL

## 2025-04-18 VITALS
RESPIRATION RATE: 18 BRPM | HEIGHT: 67 IN | OXYGEN SATURATION: 95 % | BODY MASS INDEX: 19.58 KG/M2 | TEMPERATURE: 97.8 F | DIASTOLIC BLOOD PRESSURE: 72 MMHG | HEART RATE: 87 BPM | SYSTOLIC BLOOD PRESSURE: 147 MMHG

## 2025-04-18 DIAGNOSIS — Z23 NEED FOR DIPHTHERIA-TETANUS-PERTUSSIS (TDAP) VACCINE: ICD-10-CM

## 2025-04-18 DIAGNOSIS — S51.812A FOREARM LACERATION, LEFT, INITIAL ENCOUNTER: Primary | ICD-10-CM

## 2025-04-18 PROCEDURE — 99283 EMERGENCY DEPT VISIT LOW MDM: CPT

## 2025-04-18 PROCEDURE — 90715 TDAP VACCINE 7 YRS/> IM: CPT

## 2025-04-18 PROCEDURE — 99284 EMERGENCY DEPT VISIT MOD MDM: CPT

## 2025-04-18 PROCEDURE — 90471 IMMUNIZATION ADMIN: CPT

## 2025-04-18 PROCEDURE — 12002 RPR S/N/AX/GEN/TRNK2.6-7.5CM: CPT

## 2025-04-18 PROCEDURE — 73110 X-RAY EXAM OF WRIST: CPT

## 2025-04-18 PROCEDURE — 73130 X-RAY EXAM OF HAND: CPT

## 2025-04-18 RX ORDER — LIDOCAINE HYDROCHLORIDE 10 MG/ML
10 INJECTION, SOLUTION EPIDURAL; INFILTRATION; INTRACAUDAL; PERINEURAL ONCE
Status: COMPLETED | OUTPATIENT
Start: 2025-04-18 | End: 2025-04-18

## 2025-04-18 RX ORDER — BACITRACIN, NEOMYCIN, POLYMYXIN B 400; 3.5; 5 [USP'U]/G; MG/G; [USP'U]/G
2 OINTMENT TOPICAL ONCE
Status: COMPLETED | OUTPATIENT
Start: 2025-04-18 | End: 2025-04-18

## 2025-04-18 RX ORDER — ACETAMINOPHEN 325 MG/1
650 TABLET ORAL ONCE
Status: COMPLETED | OUTPATIENT
Start: 2025-04-18 | End: 2025-04-18

## 2025-04-18 RX ORDER — CEPHALEXIN 500 MG/1
500 CAPSULE ORAL EVERY 8 HOURS SCHEDULED
Qty: 21 CAPSULE | Refills: 0 | Status: SHIPPED | OUTPATIENT
Start: 2025-04-18 | End: 2025-04-25

## 2025-04-18 RX ORDER — ACETAMINOPHEN 160 MG/5ML
650 SUSPENSION ORAL ONCE
Status: DISCONTINUED | OUTPATIENT
Start: 2025-04-18 | End: 2025-04-18

## 2025-04-18 RX ADMIN — TETANUS TOXOID, REDUCED DIPHTHERIA TOXOID AND ACELLULAR PERTUSSIS VACCINE, ADSORBED 0.5 ML: 5; 2.5; 8; 8; 2.5 SUSPENSION INTRAMUSCULAR at 21:47

## 2025-04-18 RX ADMIN — BACITRACIN ZINC, NEOMYCIN, POLYMYXIN B SULFAT 2 SMALL APPLICATION: 5000; 3.5; 4 OINTMENT TOPICAL at 23:25

## 2025-04-18 RX ADMIN — CEPHALEXIN 500 MG: 250 CAPSULE ORAL at 23:06

## 2025-04-18 RX ADMIN — ACETAMINOPHEN 650 MG: 325 TABLET, FILM COATED ORAL at 21:19

## 2025-04-18 RX ADMIN — LIDOCAINE HYDROCHLORIDE 10 ML: 10 INJECTION, SOLUTION EPIDURAL; INFILTRATION; INTRACAUDAL at 21:48

## 2025-04-19 NOTE — ED PROVIDER NOTES
Time reflects when diagnosis was documented in both MDM as applicable and the Disposition within this note       Time User Action Codes Description Comment    4/18/2025 11:23 PM Delicia Muhammad Add [S51.813T] Forearm laceration, left, initial encounter     4/18/2025 11:24 PM Delicia Muhammad Add [Z23] Need for diphtheria-tetanus-pertussis (Tdap) vaccine           ED Disposition       ED Disposition   Discharge    Condition   Stable    Date/Time   Fri Apr 18, 2025 11:23 PM    Comment   Yaz Mayerprachi discharge to home/self care.                   Assessment & Plan   {Hyperlinks  Risk Stratification - NIHSS - HEART SCORE - Fill out sepsis note and make sure you call 5555 if severe or septic shock:[id number]}    Medical Decision Making  Amount and/or Complexity of Data Reviewed  Radiology: ordered and independent interpretation performed.    Risk  OTC drugs.  Prescription drug management.             Medications   acetaminophen (TYLENOL) tablet 650 mg (650 mg Oral Given 4/18/25 2119)   tetanus-diphtheria-acellular pertussis (BOOSTRIX) IM injection 0.5 mL (0.5 mL Intramuscular Given 4/18/25 2147)   lidocaine (PF) (XYLOCAINE-MPF) 1 % injection 10 mL (10 mL Infiltration Given 4/18/25 2148)   cephalexin (KEFLEX) capsule 500 mg (500 mg Oral Given 4/18/25 2306)   neomycin-bacitracin-polymyxin b (NEOSPORIN) ointment 2 small application (2 small application Topical Given 4/18/25 2325)       ED Risk Strat Scores                    No data recorded        SBIRT 20yo+      Flowsheet Row Most Recent Value   Initial Alcohol Screen: US AUDIT-C     1. How often do you have a drink containing alcohol? 0 Filed at: 04/18/2025 2022   2. How many drinks containing alcohol do you have on a typical day you are drinking?  0 Filed at: 04/18/2025 2022   3a. Male UNDER 65: How often do you have five or more drinks on one occasion? 0 Filed at: 04/18/2025 2022   3b. FEMALE Any Age, or MALE 65+: How often do you have 4 or more drinks on one  occassion? 0 Filed at: 04/18/2025 2022   Audit-C Score 0 Filed at: 04/18/2025 2022   GRAY: How many times in the past year have you...    Used an illegal drug or used a prescription medication for non-medical reasons? Never Filed at: 04/18/2025 2022                            History of Present Illness   {Hyperlinks  History (Med, Surg, Fam, Social) - Current Medications - Allergies  :[id number]}    Chief Complaint   Patient presents with    Extremity Laceration     Pt was walking in woods and fell onto left wrist tearing skin back on forearm. Bleeding is controlled.        History reviewed. No pertinent past medical history.   Past Surgical History:   Procedure Laterality Date    COLONOSCOPY      KNEE SURGERY      TYMPANOSTOMY TUBE PLACEMENT        History reviewed. No pertinent family history.   Social History     Tobacco Use    Smoking status: Every Day     Current packs/day: 0.50     Types: Cigarettes    Smokeless tobacco: Never   Substance Use Topics    Alcohol use: Yes     Alcohol/week: 2.0 standard drinks of alcohol     Types: 2 Glasses of wine per week     Comment: daily    Drug use: Never      E-Cigarette/Vaping      E-Cigarette/Vaping Substances      I have reviewed and agree with the history as documented.     HPI    Review of Systems        Objective   {Hyperlinks  Historical Vitals - Historical Labs - Chart Review/Microbiology - Last Echo - Code Status  :[id number]}    ED Triage Vitals [04/18/25 2019]   Temperature Pulse Blood Pressure Respirations SpO2 Patient Position - Orthostatic VS   97.8 °F (36.6 °C) 87 147/72 18 95 % --      Temp Source Heart Rate Source BP Location FiO2 (%) Pain Score    Temporal -- -- -- 4      Vitals      Date and Time Temp Pulse SpO2 Resp BP Pain Score FACES Pain Rating User   04/18/25 2019 97.8 °F (36.6 °C) 87 95 % 18 147/72 4 -- TH            Physical Exam    Results Reviewed       None            XR wrist 3+ views LEFT   ED Interpretation by ZONIA Spence  (04/18 2205)   No obvious fracture      XR hand 3+ views LEFT   ED Interpretation by ZONIA Spence (04/18 2205)   No obvious fracture          Universal Protocol:  Risks and benefits: risks, benefits and alternatives were discussed  Consent given by: patient  Required items: required blood products, implants, devices, and special equipment available  Laceration repair    Date/Time: 4/18/2025 11:20 PM    Performed by: ZONIA Spence  Authorized by: ZONIA Spence  Laceration length: 7 cm      Procedure Details:  Number of sutures: 8  Technique: simple          ED Medication and Procedure Management   Prior to Admission Medications   Prescriptions Last Dose Informant Patient Reported? Taking?   aspirin 81 mg chewable tablet   Yes No   Sig: Chew 81 mg daily   hydrOXYzine HCL (ATARAX) 25 mg tablet   No No   Sig: Take 1 tablet (25 mg total) by mouth as needed in the morning and 1 tablet (25 mg total) as needed at noon and 1 tablet (25 mg total) as needed in the evening for anxiety.   losartan (COZAAR) 50 mg tablet   Yes No   Sig: Take 50 mg by mouth daily      Facility-Administered Medications: None     Patient's Medications   Discharge Prescriptions    CEPHALEXIN (KEFLEX) 500 MG CAPSULE    Take 1 capsule (500 mg total) by mouth every 8 (eight) hours for 7 days       Start Date: 4/18/2025 End Date: 4/25/2025       Order Dose: 500 mg       Quantity: 21 capsule    Refills: 0     No discharge procedures on file.  ED SEPSIS DOCUMENTATION   Time reflects when diagnosis was documented in both MDM as applicable and the Disposition within this note       Time User Action Codes Description Comment    4/18/2025 11:23 PM Delicia Muhammad [S51.812A] Forearm laceration, left, initial encounter     4/18/2025 11:24 PM Delicia Muhammad [Z23] Need for diphtheria-tetanus-pertussis (Tdap) vaccine                  Neurological:      General: No focal deficit present.      Mental Status: She is alert and oriented to person, place, and time.   Psychiatric:         Mood and Affect: Mood normal.         Behavior: Behavior normal.         Thought Content: Thought content normal.         Judgment: Judgment normal.         Results Reviewed       None            XR wrist 3+ views LEFT   ED Interpretation by ZONIA Spence (04/18 2205)   No obvious fracture      Final Interpretation by Alvarado Marvin MD (04/20 0923)      No acute osseous abnormality.         Computerized Assisted Algorithm (CAA) may have been used to analyze all applicable images.            Workstation performed: [id number]         XR hand 3+ views LEFT   ED Interpretation by ZONIA Spence (04/18 2205)   No obvious fracture      Final Interpretation by Alvarado Marvin MD (04/20 0923)      No acute osseous abnormality.         Computerized Assisted Algorithm (CAA) may have been used to analyze all applicable images.         Workstation performed: [id number]             Universal Protocol:  procedure performed by consultantConsent: Verbal consent obtained.  Risks and benefits: risks, benefits and alternatives were discussed  Consent given by: patient  Patient understanding: patient states understanding of the procedure being performed  Patient consent: the patient's understanding of the procedure matches consent given  Procedure consent: procedure consent matches procedure scheduled  Required items: required blood products, implants, devices, and special equipment available  Laceration repair    Date/Time: 4/18/2025 11:20 PM    Performed by: ZONIA Spence  Authorized by: ZONIA Spence  Body area: upper extremity  Location details: left wrist  Laceration length: 7 cm  Foreign bodies: wood  Tendon involvement: none  Nerve involvement: none  Vascular damage: no  Anesthesia: local  infiltration    Anesthesia:  Anesthetic total: 5 mL    Wound Dehiscence:  Superficial Wound Dehiscence: simple closure      Procedure Details:  Preparation: Patient was prepped and draped in the usual sterile fashion.  Irrigation solution: saline  Irrigation method: syringe  Amount of cleaning: extensive  Skin closure: Ethilon  Number of sutures: 8  Technique: simple  Dressing: antibiotic ointment and gauze roll  Patient tolerance: patient tolerated the procedure well with no immediate complications  Cleaning details: other organic matter        ED Medication and Procedure Management   Prior to Admission Medications   Prescriptions Last Dose Informant Patient Reported? Taking?   aspirin 81 mg chewable tablet   Yes No   Sig: Chew 81 mg daily   hydrOXYzine HCL (ATARAX) 25 mg tablet   No No   Sig: Take 1 tablet (25 mg total) by mouth as needed in the morning and 1 tablet (25 mg total) as needed at noon and 1 tablet (25 mg total) as needed in the evening for anxiety.   losartan (COZAAR) 50 mg tablet   Yes No   Sig: Take 50 mg by mouth daily      Facility-Administered Medications: None     Discharge Medication List as of 4/18/2025 11:26 PM        START taking these medications    Details   cephalexin (KEFLEX) 500 mg capsule Take 1 capsule (500 mg total) by mouth every 8 (eight) hours for 7 days, Starting Fri 4/18/2025, Until Fri 4/25/2025, Normal           CONTINUE these medications which have NOT CHANGED    Details   aspirin 81 mg chewable tablet Chew 81 mg daily, Historical Med      hydrOXYzine HCL (ATARAX) 25 mg tablet Take 1 tablet (25 mg total) by mouth as needed in the morning and 1 tablet (25 mg total) as needed at noon and 1 tablet (25 mg total) as needed in the evening for anxiety., Starting Mon 5/23/2022, Normal      losartan (COZAAR) 50 mg tablet Take 50 mg by mouth daily, Historical Med           No discharge procedures on file.  ED SEPSIS DOCUMENTATION   Time reflects when diagnosis was documented in both  MDM as applicable and the Disposition within this note       Time User Action Codes Description Comment    4/18/2025 11:23 PM Delicia Muhammad [S51.812A] Forearm laceration, left, initial encounter     4/18/2025 11:24 PM Delicia Muhammad [Z23] Need for diphtheria-tetanus-pertussis (Tdap) vaccine                  ZONIA Spence  04/21/25 0015

## 2025-04-19 NOTE — DISCHARGE INSTRUCTIONS
Please try not to get the area wet for approximately 24 hours.  When cleaning the area after that you can use soap and water just please do not scrub it clean and do not scrub it dry.  Please pat while drying.  Please have sutures removed in approximately 7 days.     Watch for signs of infection. Take antibiotics until completion.

## 2025-04-21 NOTE — ED PROVIDER NOTES
Time reflects when diagnosis was documented in both MDM as applicable and the Disposition within this note       Time User Action Codes Description Comment    4/18/2025 11:23 PM Delicia Muhammad Add [S51.438Y] Forearm laceration, left, initial encounter     4/18/2025 11:24 PM Delicia Muhammad Add [Z23] Need for diphtheria-tetanus-pertussis (Tdap) vaccine           ED Disposition       ED Disposition   Discharge    Condition   Stable    Date/Time   Fri Apr 18, 2025 11:23 PM    Comment   Yaz Mayerprachi discharge to home/self care.                   Assessment & Plan   {Hyperlinks  Risk Stratification - NIHSS - HEART SCORE - Fill out sepsis note and make sure you call 5555 if severe or septic shock:[id number]}    Medical Decision Making  DDx: wrist laceration, wrist fracture, finger abrasion   Plan to check xray, update tdap,     Amount and/or Complexity of Data Reviewed  Radiology: ordered and independent interpretation performed.    Risk  OTC drugs.  Prescription drug management.             Medications   acetaminophen (TYLENOL) tablet 650 mg (650 mg Oral Given 4/18/25 2119)   tetanus-diphtheria-acellular pertussis (BOOSTRIX) IM injection 0.5 mL (0.5 mL Intramuscular Given 4/18/25 2147)   lidocaine (PF) (XYLOCAINE-MPF) 1 % injection 10 mL (10 mL Infiltration Given 4/18/25 2148)   cephalexin (KEFLEX) capsule 500 mg (500 mg Oral Given 4/18/25 2306)   neomycin-bacitracin-polymyxin b (NEOSPORIN) ointment 2 small application (2 small application Topical Given 4/18/25 2325)       ED Risk Strat Scores                    No data recorded        SBIRT 22yo+      Flowsheet Row Most Recent Value   Initial Alcohol Screen: US AUDIT-C     1. How often do you have a drink containing alcohol? 0 Filed at: 04/18/2025 2022   2. How many drinks containing alcohol do you have on a typical day you are drinking?  0 Filed at: 04/18/2025 2022   3a. Male UNDER 65: How often do you have five or more drinks on one occasion? 0 Filed at:  04/18/2025 2022   3b. FEMALE Any Age, or MALE 65+: How often do you have 4 or more drinks on one occassion? 0 Filed at: 04/18/2025 2022   Audit-C Score 0 Filed at: 04/18/2025 2022   GRAY: How many times in the past year have you...    Used an illegal drug or used a prescription medication for non-medical reasons? Never Filed at: 04/18/2025 2022                            History of Present Illness   {Hyperlinks  History (Med, Surg, Fam, Social) - Current Medications - Allergies  :[id number]}    Chief Complaint   Patient presents with   • Extremity Laceration     Pt was walking in woods and fell onto left wrist tearing skin back on forearm. Bleeding is controlled.        History reviewed. No pertinent past medical history.   Past Surgical History:   Procedure Laterality Date   • COLONOSCOPY     • KNEE SURGERY     • TYMPANOSTOMY TUBE PLACEMENT        History reviewed. No pertinent family history.   Social History     Tobacco Use   • Smoking status: Every Day     Current packs/day: 0.50     Types: Cigarettes   • Smokeless tobacco: Never   Substance Use Topics   • Alcohol use: Yes     Alcohol/week: 2.0 standard drinks of alcohol     Types: 2 Glasses of wine per week     Comment: daily   • Drug use: Never      E-Cigarette/Vaping      E-Cigarette/Vaping Substances      I have reviewed and agree with the history as documented.     HPI    Review of Systems        Objective   {Hyperlinks  Historical Vitals - Historical Labs - Chart Review/Microbiology - Last Echo - Code Status  :[id number]}    ED Triage Vitals [04/18/25 2019]   Temperature Pulse Blood Pressure Respirations SpO2 Patient Position - Orthostatic VS   97.8 °F (36.6 °C) 87 147/72 18 95 % --      Temp Source Heart Rate Source BP Location FiO2 (%) Pain Score    Temporal -- -- -- 4      Vitals      Date and Time Temp Pulse SpO2 Resp BP Pain Score FACES Pain Rating User   04/18/25 2019 97.8 °F (36.6 °C) 87 95 % 18 147/72 4 -- TH            Physical Exam    Results  Reviewed       None            XR wrist 3+ views LEFT   ED Interpretation by ZONIA Spence (04/18 2205)   No obvious fracture      Final Interpretation by Alvarado Marvin MD (04/20 0923)      No acute osseous abnormality.         Computerized Assisted Algorithm (CAA) may have been used to analyze all applicable images.            Workstation performed: [id number]         XR hand 3+ views LEFT   ED Interpretation by ZONIA Spence (04/18 2205)   No obvious fracture      Final Interpretation by Alvarado Marvin MD (04/20 0923)      No acute osseous abnormality.         Computerized Assisted Algorithm (CAA) may have been used to analyze all applicable images.         Workstation performed: [id number]             Universal Protocol:  Risks and benefits: risks, benefits and alternatives were discussed  Consent given by: patient  Required items: required blood products, implants, devices, and special equipment available  Laceration repair    Date/Time: 4/18/2025 11:20 PM    Performed by: ZONIA Spence  Authorized by: ZONIA Spence  Laceration length: 7 cm      Procedure Details:  Number of sutures: 8  Technique: simple          ED Medication and Procedure Management   Prior to Admission Medications   Prescriptions Last Dose Informant Patient Reported? Taking?   aspirin 81 mg chewable tablet   Yes No   Sig: Chew 81 mg daily   hydrOXYzine HCL (ATARAX) 25 mg tablet   No No   Sig: Take 1 tablet (25 mg total) by mouth as needed in the morning and 1 tablet (25 mg total) as needed at noon and 1 tablet (25 mg total) as needed in the evening for anxiety.   losartan (COZAAR) 50 mg tablet   Yes No   Sig: Take 50 mg by mouth daily      Facility-Administered Medications: None     Discharge Medication List as of 4/18/2025 11:26 PM        START taking these medications    Details   cephalexin (KEFLEX) 500 mg capsule Take 1 capsule (500 mg total) by mouth every 8 (eight) hours for 7  days, Starting Fri 4/18/2025, Until Fri 4/25/2025, Normal           CONTINUE these medications which have NOT CHANGED    Details   aspirin 81 mg chewable tablet Chew 81 mg daily, Historical Med      hydrOXYzine HCL (ATARAX) 25 mg tablet Take 1 tablet (25 mg total) by mouth as needed in the morning and 1 tablet (25 mg total) as needed at noon and 1 tablet (25 mg total) as needed in the evening for anxiety., Starting Mon 5/23/2022, Normal      losartan (COZAAR) 50 mg tablet Take 50 mg by mouth daily, Historical Med           No discharge procedures on file.  ED SEPSIS DOCUMENTATION   Time reflects when diagnosis was documented in both MDM as applicable and the Disposition within this note       Time User Action Codes Description Comment    4/18/2025 11:23 PM Delicia Muhammad Add [S51.812A] Forearm laceration, left, initial encounter     4/18/2025 11:24 PM Delicia Muhammad Add [Z23] Need for diphtheria-tetanus-pertussis (Tdap) vaccine